# Patient Record
Sex: FEMALE | NOT HISPANIC OR LATINO | Employment: FULL TIME | URBAN - METROPOLITAN AREA
[De-identification: names, ages, dates, MRNs, and addresses within clinical notes are randomized per-mention and may not be internally consistent; named-entity substitution may affect disease eponyms.]

---

## 2023-12-19 ENCOUNTER — HOSPITAL ENCOUNTER (EMERGENCY)
Facility: HOSPITAL | Age: 53
Discharge: HOME | End: 2023-12-20
Attending: EMERGENCY MEDICINE
Payer: COMMERCIAL

## 2023-12-19 DIAGNOSIS — R19.7 NAUSEA, VOMITING AND DIARRHEA: Primary | ICD-10-CM

## 2023-12-19 DIAGNOSIS — R11.2 NAUSEA, VOMITING AND DIARRHEA: Primary | ICD-10-CM

## 2023-12-19 LAB
ALBUMIN SERPL-MCNC: 4.7 G/DL (ref 3.5–5)
ALP BLD-CCNC: 54 U/L (ref 35–125)
ALT SERPL-CCNC: 25 U/L (ref 5–40)
ANION GAP SERPL CALC-SCNC: 13 MMOL/L
AST SERPL-CCNC: 18 U/L (ref 5–40)
BASOPHILS # BLD AUTO: 0.01 X10*3/UL (ref 0–0.1)
BASOPHILS NFR BLD AUTO: 0.1 %
BILIRUB SERPL-MCNC: 0.8 MG/DL (ref 0.1–1.2)
BUN SERPL-MCNC: 21 MG/DL (ref 8–25)
CALCIUM SERPL-MCNC: 9.7 MG/DL (ref 8.5–10.4)
CHLORIDE SERPL-SCNC: 108 MMOL/L (ref 97–107)
CO2 SERPL-SCNC: 20 MMOL/L (ref 24–31)
CREAT SERPL-MCNC: 0.9 MG/DL (ref 0.4–1.6)
EOSINOPHIL # BLD AUTO: 0.09 X10*3/UL (ref 0–0.7)
EOSINOPHIL NFR BLD AUTO: 1.3 %
ERYTHROCYTE [DISTWIDTH] IN BLOOD BY AUTOMATED COUNT: 14.8 % (ref 11.5–14.5)
GFR SERPL CREATININE-BSD FRML MDRD: 76 ML/MIN/1.73M*2
GLUCOSE SERPL-MCNC: 128 MG/DL (ref 65–99)
HCT VFR BLD AUTO: 52.6 % (ref 36–46)
HGB BLD-MCNC: 17.5 G/DL (ref 12–16)
IMM GRANULOCYTES # BLD AUTO: 0.03 X10*3/UL (ref 0–0.7)
IMM GRANULOCYTES NFR BLD AUTO: 0.4 % (ref 0–0.9)
LYMPHOCYTES # BLD AUTO: 1.36 X10*3/UL (ref 1.2–4.8)
LYMPHOCYTES NFR BLD AUTO: 19.8 %
MCH RBC QN AUTO: 27.8 PG (ref 26–34)
MCHC RBC AUTO-ENTMCNC: 33.3 G/DL (ref 32–36)
MCV RBC AUTO: 84 FL (ref 80–100)
MONOCYTES # BLD AUTO: 0.38 X10*3/UL (ref 0.1–1)
MONOCYTES NFR BLD AUTO: 5.5 %
NEUTROPHILS # BLD AUTO: 5 X10*3/UL (ref 1.2–7.7)
NEUTROPHILS NFR BLD AUTO: 72.9 %
NRBC BLD-RTO: 0 /100 WBCS (ref 0–0)
PLATELET # BLD AUTO: 311 X10*3/UL (ref 150–450)
POTASSIUM SERPL-SCNC: 4.1 MMOL/L (ref 3.4–5.1)
PROT SERPL-MCNC: 7.9 G/DL (ref 5.9–7.9)
RBC # BLD AUTO: 6.3 X10*6/UL (ref 4–5.2)
SODIUM SERPL-SCNC: 141 MMOL/L (ref 133–145)
WBC # BLD AUTO: 6.9 X10*3/UL (ref 4.4–11.3)

## 2023-12-19 PROCEDURE — 96361 HYDRATE IV INFUSION ADD-ON: CPT

## 2023-12-19 PROCEDURE — 36415 COLL VENOUS BLD VENIPUNCTURE: CPT | Performed by: EMERGENCY MEDICINE

## 2023-12-19 PROCEDURE — 85025 COMPLETE CBC W/AUTO DIFF WBC: CPT | Performed by: EMERGENCY MEDICINE

## 2023-12-19 PROCEDURE — 2500000004 HC RX 250 GENERAL PHARMACY W/ HCPCS (ALT 636 FOR OP/ED): Performed by: EMERGENCY MEDICINE

## 2023-12-19 PROCEDURE — 99284 EMERGENCY DEPT VISIT MOD MDM: CPT | Mod: 25 | Performed by: EMERGENCY MEDICINE

## 2023-12-19 PROCEDURE — 83690 ASSAY OF LIPASE: CPT | Performed by: EMERGENCY MEDICINE

## 2023-12-19 PROCEDURE — 96374 THER/PROPH/DIAG INJ IV PUSH: CPT

## 2023-12-19 PROCEDURE — 80053 COMPREHEN METABOLIC PANEL: CPT | Performed by: EMERGENCY MEDICINE

## 2023-12-19 RX ADMIN — SODIUM CHLORIDE 1000 ML: 900 INJECTION, SOLUTION INTRAVENOUS at 22:41

## 2023-12-19 ASSESSMENT — PAIN - FUNCTIONAL ASSESSMENT: PAIN_FUNCTIONAL_ASSESSMENT: 0-10

## 2023-12-20 VITALS
DIASTOLIC BLOOD PRESSURE: 103 MMHG | OXYGEN SATURATION: 97 % | SYSTOLIC BLOOD PRESSURE: 150 MMHG | BODY MASS INDEX: 33.84 KG/M2 | HEIGHT: 63 IN | HEART RATE: 78 BPM | TEMPERATURE: 97.9 F | WEIGHT: 191 LBS | RESPIRATION RATE: 18 BRPM

## 2023-12-20 LAB
APPEARANCE UR: CLEAR
BACTERIA #/AREA URNS AUTO: ABNORMAL /HPF
BILIRUB UR STRIP.AUTO-MCNC: NEGATIVE MG/DL
COLOR UR: YELLOW
GLUCOSE UR STRIP.AUTO-MCNC: NORMAL MG/DL
HYALINE CASTS #/AREA URNS AUTO: ABNORMAL /LPF
KETONES UR STRIP.AUTO-MCNC: NEGATIVE MG/DL
LEUKOCYTE ESTERASE UR QL STRIP.AUTO: NEGATIVE
LIPASE SERPL-CCNC: 35 U/L (ref 16–63)
MUCOUS THREADS #/AREA URNS AUTO: ABNORMAL /LPF
NITRITE UR QL STRIP.AUTO: NEGATIVE
PH UR STRIP.AUTO: 5 [PH]
PROT UR STRIP.AUTO-MCNC: NORMAL MG/DL
RBC # UR STRIP.AUTO: NEGATIVE /UL
RBC #/AREA URNS AUTO: ABNORMAL /HPF
SP GR UR STRIP.AUTO: 1.03
SQUAMOUS #/AREA URNS AUTO: ABNORMAL /HPF
UROBILINOGEN UR STRIP.AUTO-MCNC: NORMAL MG/DL
WBC #/AREA URNS AUTO: ABNORMAL /HPF

## 2023-12-20 PROCEDURE — 2500000004 HC RX 250 GENERAL PHARMACY W/ HCPCS (ALT 636 FOR OP/ED): Performed by: EMERGENCY MEDICINE

## 2023-12-20 PROCEDURE — 81001 URINALYSIS AUTO W/SCOPE: CPT | Performed by: EMERGENCY MEDICINE

## 2023-12-20 RX ORDER — ONDANSETRON 4 MG/1
4 TABLET, FILM COATED ORAL EVERY 6 HOURS
Qty: 12 TABLET | Refills: 0 | Status: SHIPPED | OUTPATIENT
Start: 2023-12-20 | End: 2023-12-23

## 2023-12-20 RX ORDER — ONDANSETRON HYDROCHLORIDE 2 MG/ML
4 INJECTION, SOLUTION INTRAVENOUS ONCE
Status: COMPLETED | OUTPATIENT
Start: 2023-12-20 | End: 2023-12-20

## 2023-12-20 RX ADMIN — SODIUM CHLORIDE 1000 ML: 900 INJECTION, SOLUTION INTRAVENOUS at 02:24

## 2023-12-20 RX ADMIN — ONDANSETRON 4 MG: 2 INJECTION INTRAMUSCULAR; INTRAVENOUS at 01:46

## 2023-12-20 ASSESSMENT — LIFESTYLE VARIABLES
HAVE PEOPLE ANNOYED YOU BY CRITICIZING YOUR DRINKING: NO
EVER HAD A DRINK FIRST THING IN THE MORNING TO STEADY YOUR NERVES TO GET RID OF A HANGOVER: NO
HAVE YOU EVER FELT YOU SHOULD CUT DOWN ON YOUR DRINKING: NO
REASON UNABLE TO ASSESS: NO
EVER FELT BAD OR GUILTY ABOUT YOUR DRINKING: NO

## 2023-12-20 ASSESSMENT — COLUMBIA-SUICIDE SEVERITY RATING SCALE - C-SSRS
1. IN THE PAST MONTH, HAVE YOU WISHED YOU WERE DEAD OR WISHED YOU COULD GO TO SLEEP AND NOT WAKE UP?: NO
6. HAVE YOU EVER DONE ANYTHING, STARTED TO DO ANYTHING, OR PREPARED TO DO ANYTHING TO END YOUR LIFE?: NO
2. HAVE YOU ACTUALLY HAD ANY THOUGHTS OF KILLING YOURSELF?: NO

## 2023-12-20 NOTE — ED PROVIDER NOTES
"TIME: 2:21 AM 12/20/23    PCP: David Mancilla DO    HISTORY   CHIEF COMPLAINT entered by TRIAGE:  Triage note reviewed and states: Nausea and Diarrhea (Pt presents to ED with \"smelly sulfur burps\", diarrhea, and vomiting since this morning. States that this has happened before and they think ot was either H. Pylori or giardia )      HISTORY:  Historian is: patient. History/Exam Limitations: none.  Lou Sage is a 53 y.o. female who comes from home with a complaint of diarrhea     53 YOF with no PMH who presents with NVD. Started about 24 hours ago. Had dinner with family and then started to have belching with sour taste. For the last 24 hours or so, she has had NVD. No blood. No fevers. No abdominal pain. She states she has no CP. She has not used any meds for her symptoms. No urinary complaints. No VB or VD. No CP. No SOB. No rashes. SH-x3    Nursing notes reviewed. Outside records reviewed: pmd note x3     ROS:  Constitutional - No fever, no chills  HEENT - No visual changes, no eye pain, no ear pain, no sore throat  Head: Atraumatic, normocephalic  Respiratory - No cough, no shortness of breath  Cardiovascular - No dyspnea on exertion, no chest pain, no LE edema, no palpitations   Gastrointestinal - + nausea + vomiting, no abd pain, + diarrhea, no black or bloody stools  Genitourinary - No dysuria, urgency, or frequency, no hematuria; no lesions or discharge  Skin - No rash, no bruising  Musculoskeletal - No joint pain or swelling  Neurological - No weakness, no numbness, no HA, no ataxia    PAST MEDICAL HISTORY:  There is no problem list on file for this patient.   No past medical history on file.  PAST SURGICAL HISTORY:  No past surgical history on file.  MEDICATIONS:  No current facility-administered medications on file prior to encounter.     No current outpatient medications on file prior to encounter.     Medications ordered at this visit have been reconciled with the above list of " "medications.    ALLERGIES:  Not on File  SOCIAL HISTORY:  Social History     Tobacco Use   Smoking Status Not on file   Smokeless Tobacco Not on file      Social History     Substance and Sexual Activity   Alcohol Use Not on file      Social History     Substance and Sexual Activity   Drug Use Not on file     FAMILY HISTORY:  No family history on file.    PHYSICAL EXAM   Triage vitals: Visit Vitals  BP (!) 150/103   Pulse 78   Temp 36.6 °C (97.9 °F) (Temporal)   Resp 18   Ht 1.6 m (5' 3\")   Wt 86.6 kg (191 lb)   SpO2 97%   BMI 33.83 kg/m²   BSA 1.96 m²     Vital signs, oxygen saturation have been reviewed and interpreted as: nl, hr 81 on my exam    General - Alert, no acute distress, nontoxic, oriented x 4  Head - Normocephalic and atraumatic, no gross abnormalities  Eyes - Eyes normal  Ears - Ear external normal  Nose - No congestion or discharge  Throat - Clear, mmm  Neck - Supple  Lungs - CTAB, normal resp effort  Heart - RRR, no murmur  Abdomen - Soft, no tenderness, no rebound, no rigidity  Extremity - No focal tenderness, normal ROM, no trauma  Back - Normal, no CVAT  Skin - Warm, dry, no rash  Neuro - CN 2-12 intact, moves all extremities spontaneously bilaterally   Psych - normal mood and affect, normal judgment    ED COURSE/MDM     MDM:  Vomiting - Acute.  Ddx includes SBO, ileus, pancreatiis, biliary tract disease, UTI/pyelo, or viral gastroenteritis. Tests ordered and data reviewed includes CBC: without WBC elevation, HGB high, may be hemo concentrated, CMP without biliary issues, Lipase without elevation, UA neg for uti.  Abdo exam is completely benign including no ttp in the RUQ, RLQ, suprapubic or CVA area.  Vitals show no abnormalities, was in st on arrival but improved.  Pt treated for vomiting with fluids (med management), zofran after which they stopped vomiting and felt improved.  Will attempt a po challenge and if passes, they can be safely discharged.  Plan is to discharge on zofran. Recommended " hydration and returning with worsening nvd.     ED COURSE:    0221  Feels better after fluids. Will DC. On zofran.    ED PROVIDER INTERPRETATION OF DATA (RAD, EKG):    LABS SUMMARY:  Labs Reviewed   CBC WITH AUTO DIFFERENTIAL - Abnormal       Result Value    WBC 6.9      nRBC 0.0      RBC 6.30 (*)     Hemoglobin 17.5 (*)     Hematocrit 52.6 (*)     MCV 84      MCH 27.8      MCHC 33.3      RDW 14.8 (*)     Platelets 311      Neutrophils % 72.9      Immature Granulocytes %, Automated 0.4      Lymphocytes % 19.8      Monocytes % 5.5      Eosinophils % 1.3      Basophils % 0.1      Neutrophils Absolute 5.00      Immature Granulocytes Absolute, Automated 0.03      Lymphocytes Absolute 1.36      Monocytes Absolute 0.38      Eosinophils Absolute 0.09      Basophils Absolute 0.01     COMPREHENSIVE METABOLIC PANEL - Abnormal    Glucose 128 (*)     Sodium 141      Potassium 4.1      Chloride 108 (*)     Bicarbonate 20 (*)     Urea Nitrogen 21      Creatinine 0.90      eGFR 76      Calcium 9.7      Albumin 4.7      Alkaline Phosphatase 54      Total Protein 7.9      AST 18      Bilirubin, Total 0.8      ALT 25      Anion Gap 13     MICROSCOPIC ONLY, URINE - Abnormal    WBC, Urine 1-5      RBC, Urine NONE      Squamous Epithelial Cells, Urine 1-9 (SPARSE)      Bacteria, Urine 1+ (*)     Mucus, Urine 1+      Hyaline Casts, Urine OCCASIONAL (*)    URINALYSIS WITH REFLEX MICROSCOPIC - Normal    Color, Urine Yellow      Appearance, Urine Clear      Specific Gravity, Urine 1.029      pH, Urine 5.0      Protein, Urine 10 (TRACE)      Glucose, Urine Normal      Blood, Urine NEGATIVE      Ketones, Urine NEGATIVE      Bilirubin, Urine NEGATIVE      Urobilinogen, Urine Normal      Nitrite, Urine NEGATIVE      Leukocyte Esterase, Urine NEGATIVE     LIPASE     The above laboratory studies were interpreted by me and reveal no anemia, nl bili, ua neg, lipase nl    ED MEDICATIONS GIVEN:  Medications   sodium chloride 0.9 % bolus 1,000 mL (has  no administration in time range)   sodium chloride 0.9 % bolus 1,000 mL (0 mL intravenous Stopped 12/19/23 5410)   ondansetron (Zofran) injection 4 mg (4 mg intravenous Given 12/20/23 0146)       DIAGNOSIS AND DISPOSITION   DIAGNOSIS/IMPRESSION:  1. Nausea, vomiting and diarrhea  ondansetron (Zofran) 4 mg tablet          DISPOSITION:  Disposition: Discharge home  Condition: Good    DISCHARGE PRESCRIPTIONS/INSTRUCTIONS:    Discharge instructions given to patient. I discussed the diagnosis, treatment plan and follow-up plan with the patient and/or family. Reasons to return to the Emergency Department were reviewed in detail. All questions were answered. The patient and/or family expressed understanding of instructions and return precautions.    Referral to PCP/specialist for further evaluation, if specified:  David Mancilla DO  [address] [facility] [address]  [facility] [address]  [phone]      If symptoms worsen please return to the ER, especially if you have fever or blood in your stool. Stay hydrated. You may want to ask your primary doctor about urea breath testing, this can test for H. Pylori.      DIAGNOSTIC REPORTS:  Laboratory/radiology tests have been ordered with results reviewed and considered in the medical decision making process.    Provider Attestation (if applicable) and Signature:  Kranthi Mosquera MD  Emergency Department    Notes: Portions of this report may have been transcribed using voice recognition software. Every effort was made to ensure accuracy; however, inadvertent computerized transcription errors may be present.       Kranthi Mosquera MD  12/20/23 7858

## 2023-12-22 ENCOUNTER — LAB (OUTPATIENT)
Dept: LAB | Facility: LAB | Age: 53
End: 2023-12-22
Payer: COMMERCIAL

## 2023-12-22 DIAGNOSIS — K21.9 GASTRO-ESOPHAGEAL REFLUX DISEASE WITHOUT ESOPHAGITIS: Primary | ICD-10-CM

## 2023-12-22 PROCEDURE — 87449 NOS EACH ORGANISM AG IA: CPT

## 2023-12-27 LAB — H PYLORI AG STL QL IA: NEGATIVE

## 2025-01-23 ENCOUNTER — HOSPITAL ENCOUNTER (OUTPATIENT)
Dept: RADIOLOGY | Facility: HOSPITAL | Age: 55
Discharge: HOME | End: 2025-01-23
Payer: COMMERCIAL

## 2025-01-23 VITALS — WEIGHT: 191 LBS | BODY MASS INDEX: 33.84 KG/M2 | HEIGHT: 63 IN

## 2025-01-23 DIAGNOSIS — Z12.31 ENCOUNTER FOR SCREENING MAMMOGRAM FOR MALIGNANT NEOPLASM OF BREAST: ICD-10-CM

## 2025-01-23 PROCEDURE — 77063 BREAST TOMOSYNTHESIS BI: CPT

## 2025-02-11 ENCOUNTER — HOSPITAL ENCOUNTER (OUTPATIENT)
Dept: RADIOLOGY | Facility: EXTERNAL LOCATION | Age: 55
Discharge: HOME | End: 2025-02-11

## 2025-04-08 PROCEDURE — 87624 HPV HI-RISK TYP POOLED RSLT: CPT

## 2025-04-08 PROCEDURE — 88175 CYTOPATH C/V AUTO FLUID REDO: CPT

## 2025-04-10 ENCOUNTER — LAB REQUISITION (OUTPATIENT)
Dept: LAB | Facility: HOSPITAL | Age: 55
End: 2025-04-10
Payer: COMMERCIAL

## 2025-04-10 DIAGNOSIS — Z12.4 ENCOUNTER FOR SCREENING FOR MALIGNANT NEOPLASM OF CERVIX: ICD-10-CM

## 2025-04-10 DIAGNOSIS — Z11.51 ENCOUNTER FOR SCREENING FOR HUMAN PAPILLOMAVIRUS (HPV): ICD-10-CM

## 2025-04-10 DIAGNOSIS — Z01.419 ENCOUNTER FOR GYNECOLOGICAL EXAMINATION (GENERAL) (ROUTINE) WITHOUT ABNORMAL FINDINGS: ICD-10-CM

## 2025-04-23 LAB
CYTOLOGY CMNT CVX/VAG CYTO-IMP: NORMAL
HPV HR 12 DNA GENITAL QL NAA+PROBE: NEGATIVE
HPV HR GENOTYPES PNL CVX NAA+PROBE: NEGATIVE
HPV16 DNA SPEC QL NAA+PROBE: NEGATIVE
HPV18 DNA SPEC QL NAA+PROBE: NEGATIVE
LAB AP HPV GENOTYPE QUESTION: YES
LAB AP HPV HR: NORMAL
LABORATORY COMMENT REPORT: NORMAL
LMP START DATE: NORMAL
MENSTRUAL HX REPORTED: NORMAL
PATH REPORT.TOTAL CANCER: NORMAL

## 2025-05-04 ENCOUNTER — HOSPITAL ENCOUNTER (OUTPATIENT)
Dept: RADIOLOGY | Facility: HOSPITAL | Age: 55
Discharge: HOME | End: 2025-05-04
Payer: COMMERCIAL

## 2025-05-04 DIAGNOSIS — I10 ESSENTIAL (PRIMARY) HYPERTENSION: ICD-10-CM

## 2025-05-04 PROCEDURE — 75571 CT HRT W/O DYE W/CA TEST: CPT
